# Patient Record
Sex: MALE | Race: OTHER | HISPANIC OR LATINO | URBAN - METROPOLITAN AREA
[De-identification: names, ages, dates, MRNs, and addresses within clinical notes are randomized per-mention and may not be internally consistent; named-entity substitution may affect disease eponyms.]

---

## 2017-03-19 ENCOUNTER — EMERGENCY (EMERGENCY)
Facility: HOSPITAL | Age: 37
LOS: 1 days | Discharge: HOME | End: 2017-03-19
Attending: EMERGENCY MEDICINE
Payer: MEDICAID

## 2017-03-19 VITALS
TEMPERATURE: 98 F | DIASTOLIC BLOOD PRESSURE: 79 MMHG | RESPIRATION RATE: 18 BRPM | OXYGEN SATURATION: 100 % | HEART RATE: 83 BPM | WEIGHT: 149.91 LBS | HEIGHT: 67 IN | SYSTOLIC BLOOD PRESSURE: 118 MMHG

## 2017-03-19 VITALS
TEMPERATURE: 98 F | SYSTOLIC BLOOD PRESSURE: 123 MMHG | RESPIRATION RATE: 16 BRPM | HEART RATE: 75 BPM | OXYGEN SATURATION: 98 % | DIASTOLIC BLOOD PRESSURE: 84 MMHG
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
## 2017-03-19 DIAGNOSIS — S29.011A STRAIN OF MUSCLE AND TENDON OF FRONT WALL OF THORAX, INITIAL ENCOUNTER: ICD-10-CM

## 2017-03-19 DIAGNOSIS — R07.89 OTHER CHEST PAIN: ICD-10-CM
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
## 2017-03-19 PROCEDURE — 93010 ELECTROCARDIOGRAM REPORT: CPT

## 2017-03-19 PROCEDURE — 93005 ELECTROCARDIOGRAM TRACING: CPT

## 2017-03-19 PROCEDURE — 96372 THER/PROPH/DIAG INJ SC/IM: CPT

## 2017-03-19 PROCEDURE — 99284 EMERGENCY DEPT VISIT MOD MDM: CPT | Mod: 25

## 2017-03-19 PROCEDURE — 71020: CPT | Mod: 26

## 2017-03-19 PROCEDURE — 99283 EMERGENCY DEPT VISIT LOW MDM: CPT | Mod: 25

## 2017-03-19 PROCEDURE — 71046 X-RAY EXAM CHEST 2 VIEWS: CPT

## 2017-03-19 RX ORDER — METHOCARBAMOL 500 MG/1
1500 TABLET, FILM COATED ORAL ONCE
Qty: 0 | Refills: 0 | Status: COMPLETED | OUTPATIENT
Start: 2017-03-19 | End: 2017-03-19

## 2017-03-19 RX ORDER — KETOROLAC TROMETHAMINE 30 MG/ML
30 SYRINGE (ML) INJECTION ONCE
Qty: 0 | Refills: 0 | Status: DISCONTINUED | OUTPATIENT
Start: 2017-03-19 | End: 2017-03-19

## 2017-03-19 RX ADMIN — METHOCARBAMOL 1500 MILLIGRAM(S): 500 TABLET, FILM COATED ORAL at 02:43

## 2017-03-19 RX ADMIN — Medication 30 MILLIGRAM(S): at 02:43

## 2017-03-19 RX ADMIN — Medication 30 MILLIGRAM(S): at 03:03

## 2017-03-19 NOTE — ED PROVIDER NOTE - OBJECTIVE STATEMENT
37 yo M p/w spastic left upper chest pain tonight that worsens with movement.  no sob, nausea, diaphoresis.  pain radiates to shoulder.  admits heavy lifting at work daily.

## 2017-03-19 NOTE — ED ADULT NURSE NOTE - OBJECTIVE STATEMENT
pt reports 7/10 aching left pectoral pain radiating to the shoulder and back. constant starting today, with no other symptoms. pt reports he lifts 50 lbs boxes regularly at work. pt denies recent trauma. pain is reproducible. sitting calm in bed. a and o x3. breathing even and unlabored. dickson. perrl. lungs cta. cap refill brisk. skin w/d/i. + and = peripheral pulses. no le edema. nsr on cm. abdomen soft nontender nondistended. pt has no complaints at this time. will continue to monitor.

## 2017-03-19 NOTE — ED ADULT NURSE NOTE - CHPI ED SYMPTOMS NEG
no nausea/no vomiting/no dizziness/no diaphoresis/no fever/no back pain/no shortness of breath/no cough/no chills/no syncope

## 2020-09-20 ENCOUNTER — EMERGENCY (EMERGENCY)
Facility: HOSPITAL | Age: 40
LOS: 1 days | Discharge: HOME | End: 2020-09-20
Attending: STUDENT IN AN ORGANIZED HEALTH CARE EDUCATION/TRAINING PROGRAM
Payer: COMMERCIAL

## 2020-09-20 VITALS
HEIGHT: 67 IN | TEMPERATURE: 98 F | HEART RATE: 125 BPM | DIASTOLIC BLOOD PRESSURE: 65 MMHG | WEIGHT: 149.91 LBS | SYSTOLIC BLOOD PRESSURE: 121 MMHG | RESPIRATION RATE: 20 BRPM | OXYGEN SATURATION: 99 %

## 2020-09-20 LAB
ALBUMIN SERPL ELPH-MCNC: 4.2 G/DL — SIGNIFICANT CHANGE UP (ref 3.3–5.2)
ALP SERPL-CCNC: 63 U/L — SIGNIFICANT CHANGE UP (ref 40–120)
ALT FLD-CCNC: 12 U/L — SIGNIFICANT CHANGE UP
ANION GAP SERPL CALC-SCNC: 11 MMOL/L — SIGNIFICANT CHANGE UP (ref 5–17)
AST SERPL-CCNC: 19 U/L — SIGNIFICANT CHANGE UP
BASOPHILS # BLD AUTO: 0.04 K/UL — SIGNIFICANT CHANGE UP (ref 0–0.2)
BASOPHILS NFR BLD AUTO: 0.6 % — SIGNIFICANT CHANGE UP (ref 0–2)
BILIRUB SERPL-MCNC: 0.7 MG/DL — SIGNIFICANT CHANGE UP (ref 0.4–2)
BUN SERPL-MCNC: 15 MG/DL — SIGNIFICANT CHANGE UP (ref 8–20)
CALCIUM SERPL-MCNC: 9 MG/DL — SIGNIFICANT CHANGE UP (ref 8.6–10.2)
CHLORIDE SERPL-SCNC: 104 MMOL/L — SIGNIFICANT CHANGE UP (ref 98–107)
CO2 SERPL-SCNC: 28 MMOL/L — SIGNIFICANT CHANGE UP (ref 22–29)
CREAT SERPL-MCNC: 0.64 MG/DL — SIGNIFICANT CHANGE UP (ref 0.5–1.3)
EOSINOPHIL # BLD AUTO: 0.11 K/UL — SIGNIFICANT CHANGE UP (ref 0–0.5)
EOSINOPHIL NFR BLD AUTO: 1.7 % — SIGNIFICANT CHANGE UP (ref 0–6)
GLUCOSE SERPL-MCNC: 108 MG/DL — HIGH (ref 70–99)
HCT VFR BLD CALC: 38.1 % — LOW (ref 39–50)
HGB BLD-MCNC: 12.5 G/DL — LOW (ref 13–17)
IMM GRANULOCYTES NFR BLD AUTO: 0.3 % — SIGNIFICANT CHANGE UP (ref 0–1.5)
INR BLD: 1.13 RATIO — SIGNIFICANT CHANGE UP (ref 0.88–1.16)
LYMPHOCYTES # BLD AUTO: 1.46 K/UL — SIGNIFICANT CHANGE UP (ref 1–3.3)
LYMPHOCYTES # BLD AUTO: 22 % — SIGNIFICANT CHANGE UP (ref 13–44)
MCHC RBC-ENTMCNC: 30.1 PG — SIGNIFICANT CHANGE UP (ref 27–34)
MCHC RBC-ENTMCNC: 32.8 GM/DL — SIGNIFICANT CHANGE UP (ref 32–36)
MCV RBC AUTO: 91.8 FL — SIGNIFICANT CHANGE UP (ref 80–100)
MONOCYTES # BLD AUTO: 0.5 K/UL — SIGNIFICANT CHANGE UP (ref 0–0.9)
MONOCYTES NFR BLD AUTO: 7.5 % — SIGNIFICANT CHANGE UP (ref 2–14)
NEUTROPHILS # BLD AUTO: 4.52 K/UL — SIGNIFICANT CHANGE UP (ref 1.8–7.4)
NEUTROPHILS NFR BLD AUTO: 67.9 % — SIGNIFICANT CHANGE UP (ref 43–77)
PLATELET # BLD AUTO: 190 K/UL — SIGNIFICANT CHANGE UP (ref 150–400)
POTASSIUM SERPL-MCNC: 3.7 MMOL/L — SIGNIFICANT CHANGE UP (ref 3.5–5.3)
POTASSIUM SERPL-SCNC: 3.7 MMOL/L — SIGNIFICANT CHANGE UP (ref 3.5–5.3)
PROT SERPL-MCNC: 6.6 G/DL — SIGNIFICANT CHANGE UP (ref 6.6–8.7)
PROTHROM AB SERPL-ACNC: 13 SEC — SIGNIFICANT CHANGE UP (ref 10.6–13.6)
RBC # BLD: 4.15 M/UL — LOW (ref 4.2–5.8)
RBC # FLD: 11.3 % — SIGNIFICANT CHANGE UP (ref 10.3–14.5)
SODIUM SERPL-SCNC: 142 MMOL/L — SIGNIFICANT CHANGE UP (ref 135–145)
WBC # BLD: 6.65 K/UL — SIGNIFICANT CHANGE UP (ref 3.8–10.5)
WBC # FLD AUTO: 6.65 K/UL — SIGNIFICANT CHANGE UP (ref 3.8–10.5)

## 2020-09-20 PROCEDURE — 99285 EMERGENCY DEPT VISIT HI MDM: CPT

## 2020-09-20 PROCEDURE — 96374 THER/PROPH/DIAG INJ IV PUSH: CPT

## 2020-09-20 PROCEDURE — 85610 PROTHROMBIN TIME: CPT

## 2020-09-20 PROCEDURE — 36415 COLL VENOUS BLD VENIPUNCTURE: CPT

## 2020-09-20 PROCEDURE — 99284 EMERGENCY DEPT VISIT MOD MDM: CPT | Mod: 25

## 2020-09-20 PROCEDURE — 72131 CT LUMBAR SPINE W/O DYE: CPT | Mod: 26

## 2020-09-20 PROCEDURE — 85025 COMPLETE CBC W/AUTO DIFF WBC: CPT

## 2020-09-20 PROCEDURE — 72131 CT LUMBAR SPINE W/O DYE: CPT

## 2020-09-20 PROCEDURE — 96375 TX/PRO/DX INJ NEW DRUG ADDON: CPT

## 2020-09-20 PROCEDURE — 80053 COMPREHEN METABOLIC PANEL: CPT

## 2020-09-20 RX ORDER — IBUPROFEN 200 MG
1 TABLET ORAL
Qty: 28 | Refills: 0
Start: 2020-09-20 | End: 2020-09-26

## 2020-09-20 RX ORDER — HYDROMORPHONE HYDROCHLORIDE 2 MG/ML
0.5 INJECTION INTRAMUSCULAR; INTRAVENOUS; SUBCUTANEOUS ONCE
Refills: 0 | Status: DISCONTINUED | OUTPATIENT
Start: 2020-09-20 | End: 2020-09-20

## 2020-09-20 RX ORDER — DEXAMETHASONE 0.5 MG/5ML
6 ELIXIR ORAL ONCE
Refills: 0 | Status: COMPLETED | OUTPATIENT
Start: 2020-09-20 | End: 2020-09-20

## 2020-09-20 RX ORDER — METHOCARBAMOL 500 MG/1
1 TABLET, FILM COATED ORAL
Qty: 15 | Refills: 0
Start: 2020-09-20 | End: 2020-09-24

## 2020-09-20 RX ORDER — KETOROLAC TROMETHAMINE 30 MG/ML
15 SYRINGE (ML) INJECTION ONCE
Refills: 0 | Status: DISCONTINUED | OUTPATIENT
Start: 2020-09-20 | End: 2020-09-20

## 2020-09-20 RX ADMIN — Medication 6 MILLIGRAM(S): at 20:50

## 2020-09-20 RX ADMIN — HYDROMORPHONE HYDROCHLORIDE 0.5 MILLIGRAM(S): 2 INJECTION INTRAMUSCULAR; INTRAVENOUS; SUBCUTANEOUS at 20:49

## 2020-09-20 RX ADMIN — Medication 15 MILLIGRAM(S): at 20:50

## 2020-09-20 NOTE — ED PROVIDER NOTE - NEUROLOGICAL, MLM
Alert and oriented, no focal deficits, no motor or sensory deficits. Normal reflexes, sensation and strength.

## 2020-09-20 NOTE — ED ADULT TRIAGE NOTE - CHIEF COMPLAINT QUOTE
Pt. BIBA complaining of low back pain and difficulty with ambulation after fall in the bathroom earlier tonight.  Pt states "I was drying myself off and I heard a crack in my back and I fell because of the pain."  Pt. denies head trauma/LOC/blood thinners.  Pt. with history of back injury x 1 year ago.  Pt. denies changes with bowel/bladder habits.

## 2020-09-20 NOTE — ED ADULT NURSE NOTE - NS ED NURSE RECORD ANOTHER HT AND WT
Yes Pt hypertensive on admission 176/100  Restarted on home dose HCTZ 12.5 QD, consider increasing dose or adding another agent if pt BP remains elevated

## 2020-09-20 NOTE — ED ADULT NURSE NOTE - CHPI ED NUR SYMPTOMS NEG
no nausea/no fever/no vomiting/no tingling/no weakness/no dizziness/no decreased eating/drinking/no chills

## 2020-09-20 NOTE — ED ADULT NURSE NOTE - OBJECTIVE STATEMENT
pt states he was walking to the bathroom, heard a crack in his back and fell to the floor and was unable to get up due to pain. pt states he was walking to the bathroom, heard a crack in his back and fell to the floor on his knees and was unable to get up due to pain. states this is chronic pain x 1 year but worse today because he has been pushing heavy things at work,. denies hitting head, denies blood thinner use. denies numbness tingling. no other complaints. respirations even and unlabored. aao x4. DR at bedside.

## 2020-09-20 NOTE — ED PROVIDER NOTE - NSFOLLOWUPINSTRUCTIONS_ED_ALL_ED_FT
1) Follow up with your doctor in 1-2 weeks  2) Return to the ER for worsening or concerning symptoms  3) Take medication as prescribed for pain control      Low Back Strain    WHAT YOU NEED TO KNOW:    Low back strain is an injury to your lower back muscles or tendons. Tendons are strong tissues that connect muscles to bones. The lower back supports most of your body weight and helps you move, twist, and bend.    DISCHARGE INSTRUCTIONS:    Return to the emergency department if:   •You hear or feel a pop in your lower back.      •You have increased swelling or pain in your lower back.      •You have trouble moving your legs.      •Your legs are numb.      Contact your healthcare provider if:   •You have a fever.      •Your pain does not go away, even after treatment.       •You have questions or concerns about your condition or care.       Medicines: The following medicines may be ordered by your healthcare provider:   •Acetaminophen decreases pain and fever. It is available without a doctor's order. Ask how much to take and how often to take it. Follow directions. Acetaminophen can cause liver damage if not taken correctly.      •NSAIDs, such as ibuprofen, help decrease swelling, pain, and fever. This medicine is available with or without a doctor's order. NSAIDs can cause stomach bleeding or kidney problems in certain people. If you take blood thinner medicine, always ask your healthcare provider if NSAIDs are safe for you. Always read the medicine label and follow directions.      •Muscle relaxers help decrease pain and muscle spasms.      •Prescription pain medicine may be given. Ask how to take this medicine safely.      •Take your medicine as directed. Contact your healthcare provider if you think your medicine is not helping or if you have side effects. Tell him or her if you are allergic to any medicine. Keep a list of the medicines, vitamins, and herbs you take. Include the amounts, and when and why you take them. Bring the list or the pill bottles to follow-up visits. Carry your medicine list with you in case of an emergency.      Self-care:   •Rest as directed. You may need to rest in bed for a period of time after your injury. Do not lift heavy objects.       •Apply ice on your back for 15 to 20 minutes every hour or as directed. Use an ice pack, or put crushed ice in a plastic bag. Cover it with a towel. Ice helps prevent tissue damage and decreases swelling and pain.      •Apply heat on your lower back for 20 to 30 minutes every 2 hours for as many days as directed. Heat helps decrease pain and muscle spasms.      •Slowly start to increase your activity as the pain decreases, or as directed.      Prevent another low back strain:   •Use correct body movements. ?Bend at the hips and knees when you  objects. Do not bend from the waist. Use your leg muscles as you lift the load. Do not use your back. Keep the object close to your chest as you lift it. Try not to twist or lift anything above your waist.      ?Change your position often when you stand for long periods of time. Rest one foot on a small box or footrest, and then switch to the other foot often.      ?Try not to sit for long periods of time. When you do, sit in a straight-backed chair with your feet flat on the floor.      ?Never reach, pull, or push while you are sitting.      •Warm up before you exercise. Do exercises that strengthen your back muscles. Ask your healthcare provider about the best exercise plan for you.      •Maintain a healthy weight. Ask your healthcare provider how much you should weigh. Ask him to help you create a weight loss plan if you are overweight.

## 2020-09-20 NOTE — ED PROVIDER NOTE - PATIENT PORTAL LINK FT
You can access the FollowMyHealth Patient Portal offered by [facility] by registering at the following website: [email or web address]. By joining [facility]’s FollowMyHealth portal, you will also be able to view your health information using other applications (apps) compatible with our system.

## 2020-09-20 NOTE — ED PROVIDER NOTE - CLINICAL SUMMARY MEDICAL DECISION MAKING FREE TEXT BOX
Pt with lower back pain secondary to likely a musculoskeletal strain but will evaluate given his complaints of hearing a "cracking" sound.

## 2020-09-20 NOTE — ED PROVIDER NOTE - OBJECTIVE STATEMENT
41 y/o M pt BIBA with no significant PMHx presents to the ED c/o lower back pain. Pt states that he was walking back from the bathroom and heard a "crack" from his back, which caused him to fall straight onto his buttocks. Once he fell he could not get himself up, but denies any LOC. His most recent episode of pain before today was 2 months ago, and slowly over the past two weeks the pain began to return. For the past two years he has been experiencing this pain and states the first experience was while he was at work. At that time he had an X-ray done that showed disc herniation. Pt states he has been taking Aleve for the pain. Denies any numbness, head trauma, LOC, neck pain, bowel/bladder incontinence. NKDA. No further complaints at this time. 41 y/o M pt BIBA with no significant PMHx presents to the ED c/o lower back pain. Pt states that he was walking back from the bathroom and heard a "crack" from his back, which caused him to fall straight onto his buttocks. Once he fell he could not get himself up, but denies any LOC. His most recent episode of pain before today was 2 months ago, and slowly over the past two weeks the pain began to return. For the past two years he has been experiencing this pain and states the first experience was while he was at work. At that time he had an X-ray done that showed disc herniation. Pt states he has been taking Aleve for the pain, his last dose was this morning at 10:00. Denies any numbness, head trauma, LOC, neck pain, bowel/bladder incontinence. NKDA. No further complaints at this time.

## 2020-09-21 VITALS
TEMPERATURE: 99 F | RESPIRATION RATE: 15 BRPM | SYSTOLIC BLOOD PRESSURE: 114 MMHG | HEART RATE: 60 BPM | OXYGEN SATURATION: 98 % | DIASTOLIC BLOOD PRESSURE: 60 MMHG

## 2024-08-28 ENCOUNTER — EMERGENCY (EMERGENCY)
Facility: HOSPITAL | Age: 44
LOS: 1 days | Discharge: HOME | End: 2024-08-28
Attending: EMERGENCY MEDICINE
Payer: COMMERCIAL

## 2024-08-28 VITALS
WEIGHT: 139.99 LBS | TEMPERATURE: 98 F | OXYGEN SATURATION: 99 % | HEART RATE: 81 BPM | HEIGHT: 67 IN | SYSTOLIC BLOOD PRESSURE: 133 MMHG | DIASTOLIC BLOOD PRESSURE: 84 MMHG | RESPIRATION RATE: 16 BRPM

## 2024-08-28 PROCEDURE — 96372 THER/PROPH/DIAG INJ SC/IM: CPT

## 2024-08-28 PROCEDURE — 99283 EMERGENCY DEPT VISIT LOW MDM: CPT

## 2024-08-28 PROCEDURE — 99284 EMERGENCY DEPT VISIT MOD MDM: CPT

## 2024-08-28 RX ORDER — METHOCARBAMOL 750 MG/1
1500 TABLET, FILM COATED ORAL ONCE
Refills: 0 | Status: COMPLETED | OUTPATIENT
Start: 2024-08-28 | End: 2024-08-28

## 2024-08-28 RX ORDER — LIDOCAINE/BENZALKONIUM/ALCOHOL
1 SOLUTION, NON-ORAL TOPICAL ONCE
Refills: 0 | Status: COMPLETED | OUTPATIENT
Start: 2024-08-28 | End: 2024-08-28

## 2024-08-28 RX ORDER — OXYCODONE AND ACETAMINOPHEN 7.5; 325 MG/1; MG/1
1 TABLET ORAL
Qty: 12 | Refills: 0
Start: 2024-08-28 | End: 2024-08-30

## 2024-08-28 RX ORDER — METHOCARBAMOL 750 MG/1
2 TABLET, FILM COATED ORAL
Qty: 30 | Refills: 0
Start: 2024-08-28 | End: 2024-09-01

## 2024-08-28 RX ORDER — KETOROLAC TROMETHAMINE 30 MG/ML
30 INJECTION, SOLUTION INTRAMUSCULAR ONCE
Refills: 0 | Status: DISCONTINUED | OUTPATIENT
Start: 2024-08-28 | End: 2024-08-28

## 2024-08-28 RX ORDER — DIAZEPAM 10 MG
5 TABLET ORAL ONCE
Refills: 0 | Status: DISCONTINUED | OUTPATIENT
Start: 2024-08-28 | End: 2024-08-28

## 2024-08-28 RX ADMIN — KETOROLAC TROMETHAMINE 30 MILLIGRAM(S): 30 INJECTION, SOLUTION INTRAMUSCULAR at 01:11

## 2024-08-28 RX ADMIN — METHOCARBAMOL 1500 MILLIGRAM(S): 750 TABLET, FILM COATED ORAL at 01:11

## 2024-08-28 RX ADMIN — Medication 5 MILLIGRAM(S): at 02:01

## 2024-08-28 RX ADMIN — Medication 1 PATCH: at 01:10

## 2024-08-28 NOTE — ED PROVIDER NOTE - PHYSICAL EXAMINATION
Physical Examination:   Gen: NAD, AOx3  Head: NCAT  HEENT: EOMI, oral mucosa moist, normal conjunctiva, neck supple  Lung: no respiratory distress  CV:  Normal perfusion  Abd: soft, NT ND  MSK: No edema, no visible deformities no midline spinal tenderness + left paraspinal   Neuro: No focal neurologic deficits  Skin: No rash   Psych: normal affect

## 2024-08-28 NOTE — ED PROVIDER NOTE - OBJECTIVE STATEMENT
44 year old male with no med hx presented to ED c/o atraumatic back pain x 4 days. Pt states Saturday was doing some lifting, shortly after felt left low back pain, progressing in severity since. low back radiates to left side down leg. took no meds prior to arrival. as per chart review had CT lumbar 2020 without acute findings. denies fever/chills, saddle anesthesia, incontinence, trauma/injury/falls, chest pain sob

## 2024-08-28 NOTE — ED PROVIDER NOTE - NSFOLLOWUPCLINICS_GEN_ALL_ED_FT
[facility]  Orthopedics  [address] [place] [id number]  Phone: [phone]  Fax:

## 2024-08-28 NOTE — ED PROVIDER NOTE - CLINICAL SUMMARY MEDICAL DECISION MAKING FREE TEXT BOX
44 year old male with no med hx presented to ED c/o atraumatic back pain x 4 days. meds, reassess, outpt ortho f/u

## 2024-08-28 NOTE — ED PROVIDER NOTE - PROGRESS NOTE DETAILS
All results discussed with the patient, and a copy of results has been provided. Patient is comfortable with dc plan for home. Opportunity for questions was provided and all questions have been addressed. Patient understands when to return to ED if symptoms worsen.  Mariella frias

## 2024-08-28 NOTE — ED PROVIDER NOTE - PATIENT PORTAL LINK FT
You can access the FollowMyHealth Patient Portal offered by [facility] by registering at the following website: [email or web address]. By joining [facility]’s FollowMyHealth portal, you will also be able to view your health information using other applications (apps) compatible with our system.

## 2024-08-28 NOTE — ED PROVIDER NOTE - NSFOLLOWUPINSTRUCTIONS_ED_ALL_ED_FT
- Please follow up with your Primary Care Doctor in 24-48 hr.  - Follow up with orthopaedics within 1 week   - Seek immediate medical care for any new, worsening or concerning signs or symptoms.   - Take medications as directed, be sure to read all instructions on packaging  - You were given copies of all your test results, please bring to your primary care doctor for review of any abnormal test results  - Follow up with  listed above, in 1 week    Feel better!

## 2024-08-28 NOTE — ED ADULT NURSE NOTE - NS ED PATIENT SAFETY CONCERN
: [place] ID #: [id number] Agency: [employer]    Scheduled Follow Up Call:   [facility] Community Health Worker called and left a message for the patient. If the patient is returning my call, please transfer the patient to [facility] at ext. [phone].   I have called the patient 2 times over the past two weeks and have been unsuccessful in reaching him/her.  The patient has not returned any of my messages.  I have mailed a letter to the patient requesting a return call and will continue attempting to reach out to the patient in one month. I will also check the patient's chart for upcoming appointments, ER reports that may contain a new phone number, or any other recent activity.    Email from Ruth from [facility]:  I had emailed the clients brother to provide me with court documents showing that he is the legal guardian (and not something else, like power of ).    Further, he said he would get me the front and back of his brother's green card.  I don t have the file in front of me so the brother will have to confirm whether this was sent.   Last, I did send some screening documents and questionnaire for client to fill it. If you can, please check in with the client/brother to make sure these documents were filled and sent back, that would be great.    Upcoming Appointments:  None    Next Outreach: 11/20/19   No

## 2024-08-28 NOTE — ED ADULT NURSE NOTE - OBJECTIVE STATEMENT
pt comes into ED A&Ox4, c/o L lower back pain that started Saturday w an unknown cause. pt denies medical hx. no other complaints of pain or discomfort at this time.

## 2024-08-28 NOTE — ED PROVIDER NOTE - ATTENDING APP SHARED VISIT CONTRIBUTION OF CARE
Susy: I performed a face to face bedside interview with patient regarding history of present illness, review of symptoms and past medical history. I completed an independent physical exam.  I have discussed patient's plan of care with advanced care provider.   I agree with note as stated above including HISTORY OF PRESENT ILLNESS, HIV, PAST MEDICAL/SURGICAL/FAMILY/SOCIAL HISTORY, ALLERGIES AND HOME MEDICATIONS, REVIEW OF SYSTEMS, PHYSICAL EXAM, MEDICAL DECISION MAKING and any PROGRESS NOTES during the time I functioned as the attending physician for this patient  unless otherwise noted. My brief assessment is as follows: several days left lower back pain, no numbness, retention/incontinence. no ivda, no f/c. non toxic, ctab, rrr, abd benign, no midline ttp. +left paravertebral lumbar ttp. neuro intact b/l LE. supportive care, no red flags for spinal cord compression.

## 2024-08-29 PROBLEM — Z00.00 ENCOUNTER FOR PREVENTIVE HEALTH EXAMINATION: Status: ACTIVE | Noted: 2024-08-29

## 2024-08-30 DIAGNOSIS — M54.50 LOW BACK PAIN, UNSPECIFIED: ICD-10-CM

## 2024-08-30 DIAGNOSIS — S33.5XXA SPRAIN OF LIGAMENTS OF LUMBAR SPINE, INITIAL ENCOUNTER: ICD-10-CM
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
## 2024-09-03 ENCOUNTER — APPOINTMENT (OUTPATIENT)
Dept: ORTHOPEDIC SURGERY | Facility: CLINIC | Age: 44
End: 2024-09-03
Payer: COMMERCIAL

## 2024-09-03 VITALS
SYSTOLIC BLOOD PRESSURE: 110 MMHG | WEIGHT: 150 LBS | HEIGHT: 67 IN | DIASTOLIC BLOOD PRESSURE: 73 MMHG | HEART RATE: 76 BPM | BODY MASS INDEX: 23.54 KG/M2

## 2024-09-03 DIAGNOSIS — M47.816 SPONDYLOSIS W/OUT MYELOPATHY OR RADICULOPATHY, LUMBAR REGION: ICD-10-CM

## 2024-09-03 PROCEDURE — 72100 X-RAY EXAM L-S SPINE 2/3 VWS: CPT

## 2024-09-03 PROCEDURE — 99203 OFFICE O/P NEW LOW 30 MIN: CPT

## 2024-09-03 RX ORDER — METHYLPREDNISOLONE 4 MG/1
4 TABLET ORAL
Qty: 1 | Refills: 0 | Status: ACTIVE | COMMUNITY
Start: 2024-09-03 | End: 1900-01-01

## 2024-09-03 RX ORDER — MELOXICAM 15 MG/1
15 TABLET ORAL
Qty: 30 | Refills: 1 | Status: ACTIVE | COMMUNITY
Start: 2024-09-03 | End: 1900-01-01

## 2024-09-03 NOTE — HISTORY OF PRESENT ILLNESS
[de-identified] : 44-year-old male presents today for initial evaluation regarding lower back pain.  States his symptoms began on 8/24/2024.  He denies any inciting without trauma.  States that he was doing his normal ADLs when he began to experience lower back pain.  He states since then his pain has progressed now starting to cause pain down the anterior left thigh.  He denies any numbness tingling weakness.  No bowel / bladder incontinence. No saddle anesthesia.  Was seen at the ER shortly after symptoms began and was discharged with ibuprofen and methocarbamol.  States that these medications and apart any significant relief.  Had a similar episode 2 years ago that was resolved with chiropractic treatment.  Thus far the patient has not started conservative treatment, no physical therapy or home exercising.  ZEYAD questionnaire is negative. There is no ataxia or other abnormal gait. Patient is not falling more than usual and does not have any decrease in dexterity. No complaints

## 2024-09-03 NOTE — DISCUSSION/SUMMARY
[Medication Risks Reviewed] : Medication risks reviewed [de-identified] : 44 year old male presents with symptoms suggestive of lumbar spondylosis, acute radiculopathy on the left in L3-L4 distribution. 35 minutes was spent reviewing the x-rays as well as discussing with the patient their clinical presentation, diagnosis and providing education. Conservative treatment was discussed with the patient at length. Anticipatory guidance regarding disease process, avoidance of acute exacerbation this was discussed at length and all patients commenting concerns were answered to the patient's satisfaction. Physical therapy for decrease pain and increase function was ordered. The patient was given home exercises as approved by [employer] and directed toward this particular process. Intermittent use of acetaminophen 500 mg 2 tablets t.i.d. p.r.n. mild to moderate pain, Medrol dosepack to be taken as instructed to be followed by Meloxicam 15 mg qd prn. Home exercise including stretching on a daily basis for 20-30 minutes was recommended. Heat, ice, topical were discussed as needed. The patient will followup in 6-8 weeks at which point in time if symptoms continue we will order MRI studies to guide treatment plan including possible injection therapy with pain management versus surgical option. All questions and concerns were addressed with the patient and they are in agreement with this plan.  This note was generated using dragon medical dictation software. A reasonable effort has been made for proofreading its contents, but typos may still remain. If there are any questions or points of clarification needed please notify my office.

## 2024-11-06 ENCOUNTER — APPOINTMENT (OUTPATIENT)
Dept: ORTHOPEDIC SURGERY | Facility: CLINIC | Age: 44
End: 2024-11-06
Payer: COMMERCIAL

## 2024-11-06 VITALS
SYSTOLIC BLOOD PRESSURE: 100 MMHG | HEART RATE: 69 BPM | BODY MASS INDEX: 23.54 KG/M2 | HEIGHT: 67 IN | WEIGHT: 150 LBS | DIASTOLIC BLOOD PRESSURE: 65 MMHG

## 2024-11-06 DIAGNOSIS — M47.816 SPONDYLOSIS W/OUT MYELOPATHY OR RADICULOPATHY, LUMBAR REGION: ICD-10-CM

## 2024-11-06 PROCEDURE — 99213 OFFICE O/P EST LOW 20 MIN: CPT

## 2025-06-19 ENCOUNTER — APPOINTMENT (OUTPATIENT)
Dept: ORTHOPEDIC SURGERY | Facility: CLINIC | Age: 45
End: 2025-06-19